# Patient Record
Sex: FEMALE | Race: WHITE
[De-identification: names, ages, dates, MRNs, and addresses within clinical notes are randomized per-mention and may not be internally consistent; named-entity substitution may affect disease eponyms.]

---

## 2017-04-19 ENCOUNTER — HOSPITAL ENCOUNTER (OUTPATIENT)
Dept: HOSPITAL 45 - C.LABSPEC | Age: 77
Discharge: HOME | End: 2017-04-19
Attending: INTERNAL MEDICINE
Payer: COMMERCIAL

## 2017-04-19 DIAGNOSIS — E78.5: ICD-10-CM

## 2017-04-19 DIAGNOSIS — R53.83: ICD-10-CM

## 2017-04-19 DIAGNOSIS — M81.0: ICD-10-CM

## 2017-04-19 DIAGNOSIS — M19.90: Primary | ICD-10-CM

## 2017-04-19 LAB
ALBUMIN/GLOB SERPL: 1.2 {RATIO} (ref 0.9–2)
ALP SERPL-CCNC: 62 U/L (ref 45–117)
ALT SERPL-CCNC: 15 U/L (ref 12–78)
ANION GAP SERPL CALC-SCNC: 8 MMOL/L (ref 3–11)
AST SERPL-CCNC: 19 U/L (ref 15–37)
BASOPHILS # BLD: 0.02 K/UL (ref 0–0.2)
BASOPHILS NFR BLD: 0.4 %
BUN SERPL-MCNC: 16 MG/DL (ref 7–18)
BUN/CREAT SERPL: 26 (ref 10–20)
CALCIUM SERPL-MCNC: 8.6 MG/DL (ref 8.5–10.1)
CHLORIDE SERPL-SCNC: 104 MMOL/L (ref 98–107)
CHOLEST/HDLC SERPL: 2.8 {RATIO}
CO2 SERPL-SCNC: 28 MMOL/L (ref 21–32)
COMPLETE: YES
CREAT SERPL-MCNC: 0.6 MG/DL (ref 0.6–1.2)
EOSINOPHIL NFR BLD AUTO: 239 K/UL (ref 130–400)
GLOBULIN SER-MCNC: 3.2 GM/DL (ref 2.5–4)
GLUCOSE SERPL-MCNC: 78 MG/DL (ref 70–99)
GLUCOSE UR QL: 76 MG/DL
HCT VFR BLD CALC: 42.6 % (ref 37–47)
IG%: 0.2 %
IMM GRANULOCYTES NFR BLD AUTO: 47.3 %
LYMPHOCYTES # BLD: 2.22 K/UL (ref 1.2–3.4)
MCH RBC QN AUTO: 29.1 PG (ref 25–34)
MCHC RBC AUTO-ENTMCNC: 32.2 G/DL (ref 32–36)
MCV RBC AUTO: 90.4 FL (ref 80–100)
MONOCYTES NFR BLD: 10.9 %
NEUTROPHILS # BLD AUTO: 3.6 %
NEUTROPHILS NFR BLD AUTO: 37.6 %
NITRITE UR QL STRIP: 71 MG/DL (ref 0–150)
PH UR: 213 MG/DL (ref 0–200)
PMV BLD AUTO: 10.5 FL (ref 7.4–10.4)
POTASSIUM SERPL-SCNC: 4 MMOL/L (ref 3.5–5.1)
RBC # BLD AUTO: 4.71 M/UL (ref 4.2–5.4)
SODIUM SERPL-SCNC: 140 MMOL/L (ref 136–145)
TSH SERPL-ACNC: 2.13 UIU/ML (ref 0.3–4.5)
VERY LOW DENSITY LIPOPROT CALC: 14 MG/DL
WBC # BLD AUTO: 4.69 K/UL (ref 4.8–10.8)

## 2017-05-03 ENCOUNTER — HOSPITAL ENCOUNTER (OUTPATIENT)
Dept: HOSPITAL 45 - C.LABSPEC | Age: 77
Discharge: HOME | End: 2017-05-03
Attending: INTERNAL MEDICINE
Payer: COMMERCIAL

## 2017-05-03 DIAGNOSIS — Z12.11: Primary | ICD-10-CM

## 2017-06-23 ENCOUNTER — HOSPITAL ENCOUNTER (OUTPATIENT)
Dept: HOSPITAL 45 - C.MAMM | Age: 77
Discharge: HOME | End: 2017-06-23
Attending: INTERNAL MEDICINE
Payer: COMMERCIAL

## 2017-06-23 DIAGNOSIS — Z12.31: Primary | ICD-10-CM

## 2017-06-23 NOTE — MAMMOGRAPHY REPORT
BILATERAL DIGITAL SCREENING MAMMOGRAM WITH CAD: 6/23/2017

CLINICAL HISTORY: Routine screening.  Patient has no complaints.  





TECHNIQUE:  Current study was also evaluated with a Computer Aided Detection (CAD) system.  Bilateral
 CC and MLO views were obtained.



COMPARISON: Comparison is made to exams dated:  6/21/2016 mammogram, 6/16/2015 mammogram, 12/30/2013 
mammogram, 12/4/2012 mammogram, 9/13/2011 mammogram - [facility], and 7/7/2009.   



BREAST COMPOSITION:  There are scattered areas of fibroglandular density in both breasts.  



FINDINGS:  No suspicious masses, calcifications, or areas of architectural distortion are noted in ei
ther breast. There has been no significant interval change compared to prior exams.  



IMPRESSION:  ACR BI-RADS CATEGORY 1: NEGATIVE

There is no mammographic evidence of malignancy. A 1 year screening mammogram is recommended.  The pa
tient will receive written notification of the results.  





Approximately 10% of breast cancers are not detected with mammography. A negative mammographic report
 should not delay biopsy if a clinically suggestive mass is present.



Moni Mena M.D.          

ah/:6/23/2017 12:51:06  



Imaging Technologist: Glenys Dawson, [facility]

letter sent: Normal 1/2  

BI-RADS Code: ACR BI-RADS Category 1: Negative

## 2018-04-17 ENCOUNTER — HOSPITAL ENCOUNTER (OUTPATIENT)
Dept: HOSPITAL 45 - C.LABSPEC | Age: 78
Discharge: HOME | End: 2018-04-17
Attending: INTERNAL MEDICINE
Payer: COMMERCIAL

## 2018-04-17 DIAGNOSIS — E78.5: ICD-10-CM

## 2018-04-17 DIAGNOSIS — M81.0: Primary | ICD-10-CM

## 2018-04-17 LAB
ALBUMIN SERPL-MCNC: 3.6 GM/DL (ref 3.4–5)
ALP SERPL-CCNC: 55 U/L (ref 45–117)
ALT SERPL-CCNC: 19 U/L (ref 12–78)
AST SERPL-CCNC: 21 U/L (ref 15–37)
BASOPHILS # BLD: 0.03 K/UL (ref 0–0.2)
BASOPHILS NFR BLD: 0.5 %
BUN SERPL-MCNC: 8 MG/DL (ref 7–18)
CALCIUM SERPL-MCNC: 9.2 MG/DL (ref 8.5–10.1)
CO2 SERPL-SCNC: 30 MMOL/L (ref 21–32)
CREAT SERPL-MCNC: 0.61 MG/DL (ref 0.6–1.2)
EOS ABS #: 0.13 K/UL (ref 0–0.5)
EOSINOPHIL NFR BLD AUTO: 298 K/UL (ref 130–400)
GLUCOSE SERPL-MCNC: 83 MG/DL (ref 70–99)
HCT VFR BLD CALC: 41.5 % (ref 37–47)
HGB BLD-MCNC: 13.9 G/DL (ref 12–16)
IG#: 0.01 K/UL (ref 0–0.02)
IMM GRANULOCYTES NFR BLD AUTO: 39.5 %
LDL CHOLESTEROL (DIRECT): 125 MG/DL
LYMPHOCYTES # BLD: 2.17 K/UL (ref 1.2–3.4)
MCH RBC QN AUTO: 29.4 PG (ref 25–34)
MCHC RBC AUTO-ENTMCNC: 33.5 G/DL (ref 32–36)
MCV RBC AUTO: 87.9 FL (ref 80–100)
MONO ABS #: 0.6 K/UL (ref 0.11–0.59)
MONOCYTES NFR BLD: 10.9 %
NEUT ABS #: 2.56 K/UL (ref 1.4–6.5)
NEUTROPHILS # BLD AUTO: 2.4 %
NEUTROPHILS NFR BLD AUTO: 46.5 %
PH UR: 189 MG/DL (ref 0–200)
PMV BLD AUTO: 9.9 FL (ref 7.4–10.4)
POTASSIUM SERPL-SCNC: 4.2 MMOL/L (ref 3.5–5.1)
PROT SERPL-MCNC: 6.9 GM/DL (ref 6.4–8.2)
RED CELL DISTRIBUTION WIDTH CV: 13.5 % (ref 11.5–14.5)
RED CELL DISTRIBUTION WIDTH SD: 43.5 FL (ref 36.4–46.3)
SODIUM SERPL-SCNC: 138 MMOL/L (ref 136–145)
WBC # BLD AUTO: 5.5 K/UL (ref 4.8–10.8)

## 2018-04-20 ENCOUNTER — HOSPITAL ENCOUNTER (OUTPATIENT)
Dept: HOSPITAL 45 - C.LABSPEC | Age: 78
End: 2018-04-20
Attending: INTERNAL MEDICINE
Payer: COMMERCIAL

## 2018-04-20 DIAGNOSIS — Z12.11: Primary | ICD-10-CM

## 2018-04-26 LAB
HEMOCCULT STL QL: NEGATIVE